# Patient Record
(demographics unavailable — no encounter records)

---

## 2025-03-13 NOTE — PHYSICAL EXAM
[1+] : left foot dorsalis pedis 1+ [Oriented To Time, Place, And Person] : oriented to person, place, and time [de-identified] : Charcot right and amputated 5th, 4th toes left foot [FreeTextEntry1] : BERRY

## 2025-03-13 NOTE — ASSESSMENT
[FreeTextEntry1] : Patient is advised to continue the glucose control. He will continue the diabetic shoes with custom inserts The right foot hyperkeratotic lesion was debrided x 1. He is got an appointment to follow-up with his cardiology team at [facility] I will follow-up with him in approximately 4 weeks

## 2025-03-13 NOTE — HISTORY OF PRESENT ILLNESS
[FreeTextEntry1] : KENDALL  is a 67 year old male diagnosed with diabetes in 2005 seen in the office  Patient has been seen at [facility] for b/l foot wounds. He states wounds have healed. He is present for foot exam, denies concerns  FBS  A1c 5.9

## 2025-04-10 NOTE — ASSESSMENT
[FreeTextEntry1] : Patient is advised to continue the glucose control. He will continue the diabetic shoes with custom inserts The right foot hyperkeratotic lesion was debrided x 1. Fungal toenails debrided 1-4 right and 1,2,3, left He is got an appointment to follow-up with his cardiology team at [facility] I will follow-up with him in approximately 4 weeks

## 2025-04-10 NOTE — PHYSICAL EXAM
[1+] : left foot dorsalis pedis 1+ [Oriented To Time, Place, And Person] : oriented to person, place, and time [de-identified] : Charcot right and amputated 5th, 4th toes left foot [FreeTextEntry1] : BERRY

## 2025-04-10 NOTE — HISTORY OF PRESENT ILLNESS
[FreeTextEntry1] : KENDALL  is a 67 year old male diagnosed with diabetes in 2005 seen in the office  Patient has been seen at [facility] for b/l foot wounds. He states wounds have healed. He is present for foot exam, denies concerns  FBS  A1c 5.9

## 2025-05-08 NOTE — PHYSICAL EXAM
[1+] : left foot dorsalis pedis 1+ [Oriented To Time, Place, And Person] : oriented to person, place, and time [de-identified] : Charcot right and amputated 5th, 4th toes left foot [FreeTextEntry1] : BERRY

## 2025-05-08 NOTE — HISTORY OF PRESENT ILLNESS
[FreeTextEntry1] : KENDALL  is a 67 year old male diagnosed with diabetes in 2005 seen in the office  Patient has been seen at [facility] [place] for b/l foot wounds. He states wounds have healed. He is present for checkup. denies concerns  FBS  A1c 5.9